# Patient Record
Sex: FEMALE | Race: WHITE | NOT HISPANIC OR LATINO
[De-identification: names, ages, dates, MRNs, and addresses within clinical notes are randomized per-mention and may not be internally consistent; named-entity substitution may affect disease eponyms.]

---

## 2017-02-22 ENCOUNTER — TRANSCRIPTION ENCOUNTER (OUTPATIENT)
Age: 27
End: 2017-02-22

## 2017-03-19 ENCOUNTER — RESULT REVIEW (OUTPATIENT)
Age: 27
End: 2017-03-19

## 2017-03-28 ENCOUNTER — RESULT REVIEW (OUTPATIENT)
Age: 27
End: 2017-03-28

## 2017-09-11 ENCOUNTER — RESULT REVIEW (OUTPATIENT)
Age: 27
End: 2017-09-11

## 2018-03-23 ENCOUNTER — RESULT REVIEW (OUTPATIENT)
Age: 28
End: 2018-03-23

## 2023-05-29 ENCOUNTER — EMERGENCY (EMERGENCY)
Facility: HOSPITAL | Age: 33
LOS: 1 days | Discharge: HOME | End: 2023-05-29
Attending: STUDENT IN AN ORGANIZED HEALTH CARE EDUCATION/TRAINING PROGRAM | Admitting: STUDENT IN AN ORGANIZED HEALTH CARE EDUCATION/TRAINING PROGRAM
Payer: COMMERCIAL

## 2023-05-29 VITALS
TEMPERATURE: 98 F | SYSTOLIC BLOOD PRESSURE: 110 MMHG | HEART RATE: 87 BPM | OXYGEN SATURATION: 100 % | RESPIRATION RATE: 16 BRPM | DIASTOLIC BLOOD PRESSURE: 62 MMHG

## 2023-05-29 PROCEDURE — 99284 EMERGENCY DEPT VISIT MOD MDM: CPT

## 2023-05-29 RX ORDER — IBUPROFEN 200 MG
400 TABLET ORAL ONCE
Refills: 0 | Status: COMPLETED | OUTPATIENT
Start: 2023-05-29 | End: 2023-05-29

## 2023-05-29 RX ORDER — ACETAMINOPHEN 500 MG
650 TABLET ORAL ONCE
Refills: 0 | Status: COMPLETED | OUTPATIENT
Start: 2023-05-29 | End: 2023-05-29

## 2023-05-29 RX ADMIN — Medication 650 MILLIGRAM(S): at 13:04
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
## 2023-05-29 NOTE — ED ADULT NURSE REASSESSMENT NOTE - NS ED NURSE REASSESS COMMENT FT1
Pt cannot take ibuprofen due to kidney function, pt states she has 1 kidney. Pt is c/o pain and requesting pain meds. MD Carrion aware. Awaiting orders.

## 2023-05-29 NOTE — ED ADULT NURSE NOTE - NSFALLUNIVINTERV_ED_ALL_ED
Bed/Stretcher in lowest position, wheels locked, appropriate side rails in place/Call bell, personal items and telephone in reach/Instruct patient to call for assistance before getting out of bed/chair/stretcher/Non-slip footwear applied when patient is off stretcher/[place] to call system/Physically safe environment - no spills, clutter or unnecessary equipment/Purposeful proactive rounding/Room/bathroom lighting operational, light cord in reach

## 2023-05-29 NOTE — ED PROVIDER NOTE - PATIENT PORTAL LINK FT
You can access the FollowMyHealth Patient Portal offered by [facility] by registering at the following website: [email or web address]. By joining [employer]’s FollowMyHealth portal, you will also be able to view your health information using other applications (apps) compatible with our system.
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
## 2023-05-29 NOTE — ED PROVIDER NOTE - NSFOLLOWUPINSTRUCTIONS_ED_ALL_ED_FT
take acetaminophen 650 mg every 6 hours and ibuprofen 400 mg every 6 hours with food. Both of these medications work differently to treat pain and inflammation. Please note that these medications are both available over the counter at most pharmacies without a doctor's prescription.     You were seen in the emergency department by a board certified emergency medicine physician.     Laboratory results and radiology results, if applicable, were reviewed, and the physician determined that your condition does not require inpatient hospitalization or further treatment in the emergency department. So, you have been discharged from the emergency department. You should follow up with your primary care physician and/or the specialist or specialists your emergency physician has designated.    Should you develop any change in symptoms, new symptoms, or have any concerns for your health and well being, you should return to the emergency department for a re-evaluation. Concerning symptoms include, but are not limited to: shortness of breath, chest pain, feeling lightheaded, passing out, weakness, numbness, inability to walk, fever, new pain, persistent vomiting, dark/black/tarry stools, difficulty urinating, etc. The emergency department is open 24/7 365, which means to say it is always open and accepting new patients for evaluation.
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
